# Patient Record
Sex: MALE | Race: WHITE
[De-identification: names, ages, dates, MRNs, and addresses within clinical notes are randomized per-mention and may not be internally consistent; named-entity substitution may affect disease eponyms.]

---

## 2018-09-27 ENCOUNTER — HOSPITAL ENCOUNTER (OUTPATIENT)
Dept: HOSPITAL 62 - OROUT | Age: 23
Discharge: HOME | End: 2018-09-27
Attending: ORTHOPAEDIC SURGERY
Payer: OTHER GOVERNMENT

## 2018-09-27 VITALS — DIASTOLIC BLOOD PRESSURE: 53 MMHG | SYSTOLIC BLOOD PRESSURE: 115 MMHG

## 2018-09-27 DIAGNOSIS — F17.210: ICD-10-CM

## 2018-09-27 DIAGNOSIS — T88.9XXA: ICD-10-CM

## 2018-09-27 DIAGNOSIS — T79.A22A: Primary | ICD-10-CM

## 2018-09-27 DIAGNOSIS — M79.605: ICD-10-CM

## 2018-09-27 LAB
ANION GAP SERPL CALC-SCNC: 10 MMOL/L (ref 5–19)
APPEARANCE UR: CLEAR
APTT PPP: YELLOW S
BILIRUB UR QL STRIP: NEGATIVE
BUN SERPL-MCNC: 12 MG/DL (ref 7–20)
CALCIUM: 9.2 MG/DL (ref 8.4–10.2)
CHLORIDE SERPL-SCNC: 106 MMOL/L (ref 98–107)
CO2 SERPL-SCNC: 24 MMOL/L (ref 22–30)
ERYTHROCYTE [DISTWIDTH] IN BLOOD BY AUTOMATED COUNT: 13.7 % (ref 11.5–14)
GLUCOSE SERPL-MCNC: 86 MG/DL (ref 75–110)
GLUCOSE UR STRIP-MCNC: NEGATIVE MG/DL
HCT VFR BLD CALC: 43.7 % (ref 37.9–51)
HGB BLD-MCNC: 15.3 G/DL (ref 13.5–17)
KETONES UR STRIP-MCNC: NEGATIVE MG/DL
MCH RBC QN AUTO: 31.6 PG (ref 27–33.4)
MCHC RBC AUTO-ENTMCNC: 35 G/DL (ref 32–36)
MCV RBC AUTO: 90 FL (ref 80–97)
NITRITE UR QL STRIP: NEGATIVE
PH UR STRIP: 6 [PH] (ref 5–9)
PLATELET # BLD: 237 10^3/UL (ref 150–450)
POTASSIUM SERPL-SCNC: 4.8 MMOL/L (ref 3.6–5)
PROT UR STRIP-MCNC: NEGATIVE MG/DL
RBC # BLD AUTO: 4.83 10^6/UL (ref 4.35–5.55)
SODIUM SERPL-SCNC: 139.6 MMOL/L (ref 137–145)
SP GR UR STRIP: 1.02
UROBILINOGEN UR-MCNC: NEGATIVE MG/DL (ref ?–2)
WBC # BLD AUTO: 5.9 10^3/UL (ref 4–10.5)

## 2018-09-27 PROCEDURE — 36415 COLL VENOUS BLD VENIPUNCTURE: CPT

## 2018-09-27 PROCEDURE — 27600 DECOMPRESSION OF LOWER LEG: CPT

## 2018-09-27 PROCEDURE — 85027 COMPLETE CBC AUTOMATED: CPT

## 2018-09-27 PROCEDURE — 81001 URINALYSIS AUTO W/SCOPE: CPT

## 2018-09-27 PROCEDURE — 80048 BASIC METABOLIC PNL TOTAL CA: CPT

## 2018-09-27 NOTE — DISCHARGE SUMMARY
Discharge Summary (SDC)





- Discharge


Final Diagnosis: 





Compartment release of the anterior lateral compartments of the left lower 

extremity.


Date of Surgery: 09/27/18


Discharge Date: 09/27/18


Condition: Good


Treatment or Instructions: 


Keep the dressing dry clean and intact for 4 days.  Then okay to remove and 

shower.


Weight-bear as tolerated with crutches.  When not ambulating recommend ice and 

elevation.


Follow-up in 10-14 days unless he develops redness swelling and drainage.





Prescriptions: 


Oxycodone HCl/Acetaminophen [Percocet 5-325 mg Tablet] 1 - 2 tab PO ASDIR PRN #

25 tablet


 PRN Reason: 


Referrals: 


ELÍAS GARCIA MD [Primary Care Provider] - 


Respiratory Treatments at Home: Deep Breathing/Coughing


Discharge Activity: No Driving - While taking narcotics, Keep Legs Elevated, No 

Lifting/Push/Pulling, Slowly Increase Activity


Home Care Assistance: None Needed


Adaptive Devices on Discharge: Axillary Crutches


Report the Following to Your Physician Immediately: Shortness of Breath, 

Vomiting, Increase in Pain, Fever over 101 Degrees, Unusual Bleeding, Redness, 

Swelling, Warmth, Numbness

## 2018-09-27 NOTE — OPERATIVE REPORT
Operative Report


DATE OF SURGERY: 09/27/18


PREOPERATIVE DIAGNOSIS: Exertional compartment syndrome left lower extremity


POSTOPERATIVE DIAGNOSIS: Same


OPERATION: Compartment release left lower extremity of the anterior and lateral 

compartments.


SURGEON: KATELYNN FIERRO


ANESTHESIA: GA


TISSUE REMOVED OR ALTERED: none


COMPLICATIONS: 





None


ESTIMATED BLOOD LOSS: 10mL


INTRAOPERATIVE FINDINGS: as above


PROCEDURE: 





Patient holding area received the preoperative antibiotics.  The extremity was 

marked and the patient was brought to the operating room.  After receiving 

general anesthetic thigh tourniquet was applied to the left lower extremity.  

Left lower extremity was prepped and draped in a normal sterile surgical 

fashion.  Timeout was done identifying the left lower extremity as the correct 

site.  Quarter percent Marcaine was injected in the anticipated incision.  A 

longitudinal incision was done on the lateral aspect of the left lower 

extremity.  Dissection was done to the fascial layer were able to identify the 

anterior and lateral compartments.  A 15 blade was then used to excise 

longitudinal incisions on both the anterior and lateral compartments.  With the 

Metzenbaum scissors I was able then to use the tips and then slide and released 

the fascial tissue both proximal and distal of both anterior and the lateral 

compartments.  Place my finger make sure that there is no restrictive bands and 

was able to have complete release.  Once I completed the release I proceeded to 

close the wound using #2 Vicryl to approximate subcutaneous fat and dermal 

layers and then used a 3-0 Monocryl to do a running subcuticular closure.  

Dermabond was applied followed by Steri-Strips and then 4 x 4 dressing.  Soft 

roll was used to overwrap the dressing and Ace bandage was applied on top of a 

soft roll.  Tourniquet was let down at 23 minutes.  Drapes were removed and the 

patient was then awoken and the LMA was removed and the patient was taken to 

PACU in a stable condition.